# Patient Record
Sex: FEMALE | Race: BLACK OR AFRICAN AMERICAN | NOT HISPANIC OR LATINO
[De-identification: names, ages, dates, MRNs, and addresses within clinical notes are randomized per-mention and may not be internally consistent; named-entity substitution may affect disease eponyms.]

---

## 2023-08-09 ENCOUNTER — APPOINTMENT (OUTPATIENT)
Dept: ORTHOPEDIC SURGERY | Facility: CLINIC | Age: 53
End: 2023-08-09
Payer: COMMERCIAL

## 2023-08-09 VITALS — HEIGHT: 64.5 IN | BODY MASS INDEX: 34.74 KG/M2 | WEIGHT: 206 LBS

## 2023-08-09 PROBLEM — Z00.00 ENCOUNTER FOR PREVENTIVE HEALTH EXAMINATION: Status: ACTIVE | Noted: 2023-08-09

## 2023-08-09 PROCEDURE — J3490M: CUSTOM | Mod: LT

## 2023-08-09 PROCEDURE — 73564 X-RAY EXAM KNEE 4 OR MORE: CPT | Mod: LT

## 2023-08-09 PROCEDURE — 20611 DRAIN/INJ JOINT/BURSA W/US: CPT | Mod: LT

## 2023-08-09 PROCEDURE — 99203 OFFICE O/P NEW LOW 30 MIN: CPT | Mod: 25

## 2023-08-09 RX ORDER — SEMAGLUTIDE 2.68 MG/ML
INJECTION, SOLUTION SUBCUTANEOUS
Refills: 0 | Status: ACTIVE | COMMUNITY

## 2023-08-09 RX ORDER — METFORMIN HYDROCHLORIDE 1000 MG/1
1000 TABLET, COATED ORAL
Refills: 0 | Status: ACTIVE | COMMUNITY

## 2023-08-09 RX ORDER — PAROXETINE 7.5 MG/1
7.5 CAPSULE ORAL
Refills: 0 | Status: ACTIVE | COMMUNITY

## 2023-08-09 RX ORDER — IBUPROFEN 200 MG/1
CAPSULE, LIQUID FILLED ORAL
Refills: 0 | Status: ACTIVE | COMMUNITY

## 2023-08-09 RX ORDER — HYALURONATE SODIUM 20 MG/2 ML
20 SYRINGE (ML) INTRAARTICULAR
Qty: 3 | Refills: 0 | Status: COMPLETED | COMMUNITY
Start: 2023-08-09 | End: 2023-12-13

## 2023-08-09 NOTE — HISTORY OF PRESENT ILLNESS
[Gradual] : gradual [9] : 9 [7] : 7 [Dull/Aching] : dull/aching [Localized] : localized [Frequent] : frequent [Household chores] : household chores [Leisure] : leisure [Work] : work [Rest] : rest [Meds] : meds [Ice] : ice [Standing] : standing [Walking] : walking [Stairs] : stairs [Full time] : Work status: full time [] : no [FreeTextEntry1] : L knee [FreeTextEntry5] : 52 year old female is here for an evaluation for the L knee. No trauma/No injury occurred. 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 Pain started the past two months, presents with soreness and L knee feels like it gives up while walking/standing. Patient states went to her primary doctor yesterday and was prescribed with ibuprofen.  [FreeTextEntry6] : Soreness  [FreeTextEntry9] : Ibuprofen; tylenol (extra strength)

## 2023-08-09 NOTE — ASSESSMENT
[FreeTextEntry1] : ATRAUMATIC LEFT KNEE PAIN X 2 MONTHS, WORSE WITH PROLONGED STANDING NO RED FLAGS XRAYS WITH MILD OA, MOD PF OA CSI DISCUSSED AND DONE TODAY, TOLERATED WELL DISCUSSED VISCO, WILL REQUEST AUTH IF NEEDED DIET & EXERCISE

## 2023-08-09 NOTE — PROCEDURE
[FreeTextEntry3] : - Large joint injection was performed of the LEFT knee.  - The indication for this procedure was pain and inflammation. Patient has tried OTC's including aspirin, Ibuprofen, Aleve, etc or prescription NSAIDS, and/or exercises at home and/or physical therapy without satisfactory response, patient had decreased mobility in the joint and the risks benefits, and alternatives have been discussed, and verbal consent was obtained. The site was prepped with alcohol, betadine, ethyl chloride sprayed topically and sterile technique used.  - An injection of Betamethasone 2cc; Marcaine 5cc injected. - Patient was advised to call if redness, pain or fever occur, apply ice for 15 minutes out of every hour for the next 12-24 hours as tolerated and patient was advised to rest the joint(s) for 2 days.  - Ultrasound guidance was indicated for this patient due to prior failure or difficult injection. All ultrasound images have been permanently captured and stored accordingly in our picture archiving and communication system. Visualization of the needle and placement of injection was performed without complication.

## 2023-08-09 NOTE — IMAGING
[Left] : left knee [All Views] : anteroposterior, lateral, skyline, and anteroposterior standing [There are no fractures, subluxations or dislocations. No significant abnormalities are seen] : There are no fractures, subluxations or dislocations. No significant abnormalities are seen [Mild tricompartmental OA medial narrowing] : Mild tricompartmental OA medial narrowing [Mild tricompartmental OA lateral narrowing] : Mild tricompartmental OA lateral narrowing [Moderate patellofemoral OA] : Moderate patellofemoral OA [de-identified] : left knee No effusion, no warmth, no ecchymosis, no erythema. medial and lateral joint ttp, patellar crepitus Range of motion 0-120 without pain 5/5 quadriceps and hamstring strength Motor and sensory intact distally Negative Maria Guadalupe Non-antalgic gait

## 2023-09-06 ENCOUNTER — APPOINTMENT (OUTPATIENT)
Dept: ORTHOPEDIC SURGERY | Facility: CLINIC | Age: 53
End: 2023-09-06

## 2023-09-06 ENCOUNTER — APPOINTMENT (OUTPATIENT)
Dept: ORTHOPEDIC SURGERY | Facility: CLINIC | Age: 53
End: 2023-09-06
Payer: COMMERCIAL

## 2023-09-06 VITALS — BODY MASS INDEX: 34.74 KG/M2 | HEIGHT: 64.5 IN | WEIGHT: 206 LBS

## 2023-09-06 PROCEDURE — 99213 OFFICE O/P EST LOW 20 MIN: CPT | Mod: 25

## 2023-09-06 PROCEDURE — 20611 DRAIN/INJ JOINT/BURSA W/US: CPT | Mod: LT

## 2023-09-06 NOTE — PROCEDURE
[Large Joint Injection] : Large joint injection [Left] : of the left [Knee] : knee [Pain] : pain [Inflammation] : inflammation [X-ray evidence of Osteoarthritis on this or prior visit] : x-ray evidence of Osteoarthritis on this or prior visit [Alcohol] : alcohol [Betadine] : betadine [Ethyl Chloride sprayed topically] : ethyl chloride sprayed topically [Sterile technique used] : sterile technique used [Euflexxa(20mg)] : 20mg of Euflexxa [#1] : series #1 [] : Patient tolerated procedure well [Call if redness, pain or fever occur] : call if redness, pain or fever occur [Apply ice for 15min out of every hour for the next 12-24 hours as tolerated] : apply ice for 15 minutes out of every hour for the next 12-24 hours as tolerated [Patient was advised to rest the joint(s) for ____ days] : patient was advised to rest the joint(s) for [unfilled] days [Previous OTC use and PT nontherapeutic] : patient has tried OTC's including aspirin, Ibuprofen, Aleve, etc or prescription NSAIDS, and/or exercises at home and/or physical therapy without satisfactory response [Patient had decreased mobility in the joint] : patient had decreased mobility in the joint [Risks, benefits, alternatives discussed / Verbal consent obtained] : the risks benefits, and alternatives have been discussed, and verbal consent was obtained [Morbid obesity] : morbid obesity [Precise injection in area of tear] : precise injection in area of tear [Altered anatomic landmarks d/t erosive arthritis] : altered anatomic landmarks d/t erosive arthritis [All ultrasound images have been permanently captured and stored accordingly in our picture archiving and communication system] : All ultrasound images have been permanently captured and stored accordingly in our picture archiving and communication system [Visualization of the needle and placement of injection was performed without complication] : visualization of the needle and placement of injection was performed without complication

## 2023-09-06 NOTE — HISTORY OF PRESENT ILLNESS
[de-identified] : 9-6-23- for euflexxa #1 left knee [Gradual] : gradual [9] : 9 [7] : 7 [Dull/Aching] : dull/aching [Localized] : localized [Frequent] : frequent [Household chores] : household chores [Leisure] : leisure [Work] : work [Rest] : rest [Meds] : meds [Ice] : ice [Standing] : standing [Walking] : walking [Stairs] : stairs [] : no [Full time] : Work status: full time [FreeTextEntry1] : L knee [FreeTextEntry5] : 52 year old female is here for an evaluation for the L knee. No trauma/No injury occurred. 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 Pain started the past two months, presents with soreness and L knee feels like it gives up while walking/standing. Patient states went to her primary doctor yesterday and was prescribed with ibuprofen.  [FreeTextEntry6] : Soreness  [FreeTextEntry9] : Ibuprofen; tylenol (extra strength)

## 2023-09-06 NOTE — IMAGING
[de-identified] : left knee No effusion, no warmth, no ecchymosis, no erythema. medial and lateral joint ttp, patellar crepitus Range of motion 0-120 without pain 5/5 quadriceps and hamstring strength Motor and sensory intact distally Negative Maria Guadalupe Non-antalgic gait [Left] : left knee [All Views] : anteroposterior, lateral, skyline, and anteroposterior standing [There are no fractures, subluxations or dislocations. No significant abnormalities are seen] : There are no fractures, subluxations or dislocations. No significant abnormalities are seen [Mild tricompartmental OA medial narrowing] : Mild tricompartmental OA medial narrowing [Mild tricompartmental OA lateral narrowing] : Mild tricompartmental OA lateral narrowing [Moderate patellofemoral OA] : Moderate patellofemoral OA

## 2023-09-06 NOTE — ASSESSMENT
[FreeTextEntry1] : ATRAUMATIC LEFT KNEE PAIN X 2 MONTHS, WORSE WITH PROLONGED STANDING NO RED FLAGS XRAYS WITH MILD OA, MOD PF OA

## 2023-09-13 ENCOUNTER — APPOINTMENT (OUTPATIENT)
Dept: ORTHOPEDIC SURGERY | Facility: CLINIC | Age: 53
End: 2023-09-13

## 2023-09-13 ENCOUNTER — APPOINTMENT (OUTPATIENT)
Dept: ORTHOPEDIC SURGERY | Facility: CLINIC | Age: 53
End: 2023-09-13
Payer: COMMERCIAL

## 2023-09-13 VITALS — BODY MASS INDEX: 35.17 KG/M2 | WEIGHT: 206 LBS | HEIGHT: 64 IN

## 2023-09-13 PROCEDURE — 20611 DRAIN/INJ JOINT/BURSA W/US: CPT | Mod: LT

## 2023-09-13 PROCEDURE — 99212 OFFICE O/P EST SF 10 MIN: CPT | Mod: 25

## 2023-09-20 ENCOUNTER — APPOINTMENT (OUTPATIENT)
Dept: ORTHOPEDIC SURGERY | Facility: CLINIC | Age: 53
End: 2023-09-20

## 2023-09-20 ENCOUNTER — APPOINTMENT (OUTPATIENT)
Dept: ORTHOPEDIC SURGERY | Facility: CLINIC | Age: 53
End: 2023-09-20
Payer: COMMERCIAL

## 2023-09-20 VITALS — BODY MASS INDEX: 35.17 KG/M2 | WEIGHT: 206 LBS | HEIGHT: 64 IN

## 2023-09-20 PROCEDURE — 20610 DRAIN/INJ JOINT/BURSA W/O US: CPT | Mod: LT

## 2023-09-20 PROCEDURE — 99212 OFFICE O/P EST SF 10 MIN: CPT | Mod: 25

## 2024-02-29 ENCOUNTER — APPOINTMENT (OUTPATIENT)
Dept: ORTHOPEDIC SURGERY | Facility: CLINIC | Age: 54
End: 2024-02-29
Payer: COMMERCIAL

## 2024-02-29 VITALS — BODY MASS INDEX: 33.29 KG/M2 | HEIGHT: 64 IN | WEIGHT: 195 LBS

## 2024-02-29 DIAGNOSIS — E11.9 TYPE 2 DIABETES MELLITUS W/OUT COMPLICATIONS: ICD-10-CM

## 2024-02-29 PROCEDURE — 99214 OFFICE O/P EST MOD 30 MIN: CPT | Mod: 25

## 2024-02-29 PROCEDURE — 20611 DRAIN/INJ JOINT/BURSA W/US: CPT | Mod: 50

## 2024-02-29 PROCEDURE — J3490M: CUSTOM

## 2024-02-29 RX ORDER — HYALURONATE SODIUM 20 MG/2 ML
20 SYRINGE (ML) INTRAARTICULAR
Qty: 6 | Refills: 0 | Status: ACTIVE | COMMUNITY
Start: 2024-02-29 | End: 1900-01-01

## 2024-02-29 NOTE — IMAGING
[de-identified] : b/l knees Varus deformity Mild effusion, no warmth, no ecchymosis Medial joint line tenderness to palpation  Range of motion 0-110 with associated crepitus 5/5 quadriceps and hamstring strength Ligamentously stable Motor and sensory intact distally Non antalgic gait Negative Maria Guadalupe

## 2024-02-29 NOTE — ASSESSMENT
[FreeTextEntry1] : ACUTE EXACERBATION OF CHRONIC B/L KNEE OA (R>L)  GOOD RELIEF WITH EUFLEXXA INJECTIONS ON LEFT SIDE IN SEPT 2023  WILL SUBMIT AUTH FOR B/L KNEE EUFLEXXA INJECTIONS TO BE ADMINISTERED ON OR AFTER 3/21/24  CSI RIGHT KNEE DISCUSSED AND DONE TODAY - TOLERATED WELL  ADVISED OF TRANSIENT SPIKE IN BGL AFTER CSI  CRYOTHERAPY, NSAIDS PRN  RTC 2 WEEKS FOR POSSIBLE CSI INTO LEFT KNEE OR WHEN SHE IS READY TO START GEL INJECTIONS

## 2024-02-29 NOTE — HISTORY OF PRESENT ILLNESS
[de-identified] : 2/28/24: Here for left knee follow up. Finished euflexxa for the left knee on 9/20/23 with good relief until recently. States her right knee is now bothering her. Notes weakness and instability at times.  [FreeTextEntry1] : L knee

## 2024-03-14 ENCOUNTER — APPOINTMENT (OUTPATIENT)
Dept: ORTHOPEDIC SURGERY | Facility: CLINIC | Age: 54
End: 2024-03-14

## 2024-04-10 ENCOUNTER — APPOINTMENT (OUTPATIENT)
Dept: ORTHOPEDIC SURGERY | Facility: CLINIC | Age: 54
End: 2024-04-10
Payer: COMMERCIAL

## 2024-04-10 VITALS — BODY MASS INDEX: 33.29 KG/M2 | WEIGHT: 195 LBS | HEIGHT: 64 IN

## 2024-04-10 PROCEDURE — 20611 DRAIN/INJ JOINT/BURSA W/US: CPT | Mod: 50

## 2024-04-10 PROCEDURE — 99213 OFFICE O/P EST LOW 20 MIN: CPT | Mod: 25

## 2024-04-10 NOTE — PROCEDURE
[FreeTextEntry3] : Large joint injection was performed of the BILAT knee.  The indication for this procedure was pain, inflammation and x-ray evidence of Osteoarthritis on this or prior visit. The site was prepped with alcohol, betadine, ethyl chloride sprayed topically and sterile technique used.  An injection of EUFLEXXA 2ml, series #1 was used.   Patient was advised to call if redness, pain or fever occur, apply ice for 15 minutes out of every hour for the next 12-24 hours as tolerated and patient was advised to rest the joint(s) for 2 days. Patient has tried OTC's including aspirin, Ibuprofen, Aleve, etc or prescription NSAIDS, and/or exercises at home and/or physical therapy without satisfactory response, patient had decreased mobility in the joint and the risks benefits, and alternatives have been discussed, and verbal consent was obtained.  Ultrasound guidance was indicated for this patient due to prior failure or difficult injection. All ultrasound images have been permanently captured and stored accordingly in our picture archiving and communication system. Visualization of the needle and placement of injection was performed without complication.

## 2024-04-10 NOTE — HISTORY OF PRESENT ILLNESS
[1] : 1 [Euflexxa] : Euflexxa [de-identified] : Patient is here to begin bilateral knee euflexxa injections.  [FreeTextEntry1] : Knees

## 2024-04-10 NOTE — IMAGING
[de-identified] : b/l knees Varus deformity Mild effusion, no warmth, no ecchymosis Medial joint line tenderness to palpation  Range of motion 0-110 with associated crepitus 5/5 quadriceps and hamstring strength Ligamentously stable Motor and sensory intact distally Non antalgic gait Negative Maria Guadalupe

## 2024-04-10 NOTE — ASSESSMENT
[FreeTextEntry1] : Follow-up of chronic bilateral knee arthritis exacerbation.  The symptoms persist.  We had a discussion about possible treatment options.  Continue anti-inflammatories and weight loss.  We discussed risk benefits and alternatives to Euflexxa series.  She wants to proceed  -The patient's diagnosis was reviewed in detail. Explained this is a chronic, progressive deteriorating condition that may need treatment from time to time as the flare-ups occur.  -The natural progression of Osteoarthritis was explained to the patient. We discussed the possible treatment options from conservative to operative.  -We discussed prescription strength NSAIDs, Glucosamine and Chondroitin sulfate, and Physical Therapy as well different types of injections including viscosupplementation.  -We also discussed that at some point they may progress to needed a total knee replacement.

## 2024-04-18 ENCOUNTER — APPOINTMENT (OUTPATIENT)
Dept: ORTHOPEDIC SURGERY | Facility: CLINIC | Age: 54
End: 2024-04-18
Payer: COMMERCIAL

## 2024-04-18 VITALS — BODY MASS INDEX: 33.29 KG/M2 | WEIGHT: 195 LBS | HEIGHT: 64 IN

## 2024-04-18 PROCEDURE — 99212 OFFICE O/P EST SF 10 MIN: CPT | Mod: 25

## 2024-04-18 PROCEDURE — 20611 DRAIN/INJ JOINT/BURSA W/US: CPT | Mod: 50

## 2024-04-18 NOTE — ASSESSMENT
[FreeTextEntry1] : B/L KNEE EUFLEXXA #2 TODAY - TOLERATED WELL  DISCUSSED POST PROCEDURE RECOMMENDATIONS  RTC 1 WEEK TO CONTINUE SERIES   -The patient's diagnosis was reviewed in detail. Explained this is a chronic, progressive deteriorating condition that may need treatment from time to time as the flare-ups occur.  -The natural progression of Osteoarthritis was explained to the patient. We discussed the possible treatment options from conservative to operative.  -We discussed prescription strength NSAIDs, Glucosamine and Chondroitin sulfate, and Physical Therapy as well different types of injections including viscosupplementation.  -We also discussed that at some point they may progress to needed a total knee replacement.

## 2024-04-18 NOTE — PROCEDURE
[FreeTextEntry3] : Large joint injection was performed of the BILAT knee.  The indication for this procedure was pain, inflammation and x-ray evidence of Osteoarthritis on this or prior visit. The site was prepped with alcohol, betadine, ethyl chloride sprayed topically and sterile technique used.  An injection of EUFLEXXA 2ml, series #2 was used.   Patient was advised to call if redness, pain or fever occur, apply ice for 15 minutes out of every hour for the next 12-24 hours as tolerated and patient was advised to rest the joint(s) for 2 days. Patient has tried OTC's including aspirin, Ibuprofen, Aleve, etc or prescription NSAIDS, and/or exercises at home and/or physical therapy without satisfactory response, patient had decreased mobility in the joint and the risks benefits, and alternatives have been discussed, and verbal consent was obtained.  Ultrasound guidance was indicated for this patient due to prior failure or difficult injection. All ultrasound images have been permanently captured and stored accordingly in our picture archiving and communication system. Visualization of the needle and placement of injection was performed without complication.

## 2024-04-18 NOTE — IMAGING
[de-identified] : b/l knees Varus deformity Mild effusion, no warmth, no ecchymosis Medial joint line tenderness to palpation  Range of motion 0-110 with associated crepitus 5/5 quadriceps and hamstring strength Ligamentously stable Motor and sensory intact distally Non antalgic gait Negative Maria Guadalupe
No abnormalities noted

## 2024-04-18 NOTE — HISTORY OF PRESENT ILLNESS
[2] : 2 [Euflexxa] : Euflexxa [de-identified] : Patient is here to begin bilateral knee euflexxa injections.   4/18/24: Here for B/L knee euflexxa #2.  [FreeTextEntry1] : Knees

## 2024-04-25 ENCOUNTER — APPOINTMENT (OUTPATIENT)
Dept: ORTHOPEDIC SURGERY | Facility: CLINIC | Age: 54
End: 2024-04-25
Payer: COMMERCIAL

## 2024-04-25 VITALS — WEIGHT: 195 LBS | BODY MASS INDEX: 33.29 KG/M2 | HEIGHT: 64 IN

## 2024-04-25 DIAGNOSIS — M17.12 UNILATERAL PRIMARY OSTEOARTHRITIS, LEFT KNEE: ICD-10-CM

## 2024-04-25 DIAGNOSIS — M17.11 UNILATERAL PRIMARY OSTEOARTHRITIS, RIGHT KNEE: ICD-10-CM

## 2024-04-25 DIAGNOSIS — E66.9 OBESITY, UNSPECIFIED: ICD-10-CM

## 2024-04-25 PROCEDURE — 99212 OFFICE O/P EST SF 10 MIN: CPT | Mod: 25

## 2024-04-25 PROCEDURE — 20611 DRAIN/INJ JOINT/BURSA W/US: CPT | Mod: 50

## 2024-04-25 NOTE — PROCEDURE
[FreeTextEntry3] : Large joint injection was performed of the BILAT knee.  The indication for this procedure was pain, inflammation and x-ray evidence of Osteoarthritis on this or prior visit. The site was prepped with alcohol, betadine, ethyl chloride sprayed topically and sterile technique used.  An injection of EUFLEXXA 2ml, series #3 was used.   Patient was advised to call if redness, pain or fever occur, apply ice for 15 minutes out of every hour for the next 12-24 hours as tolerated and patient was advised to rest the joint(s) for 2 days. Patient has tried OTC's including aspirin, Ibuprofen, Aleve, etc or prescription NSAIDS, and/or exercises at home and/or physical therapy without satisfactory response, patient had decreased mobility in the joint and the risks benefits, and alternatives have been discussed, and verbal consent was obtained.  Ultrasound guidance was indicated for this patient due to prior failure or difficult injection. All ultrasound images have been permanently captured and stored accordingly in our picture archiving and communication system. Visualization of the needle and placement of injection was performed without complication.

## 2024-04-25 NOTE — HISTORY OF PRESENT ILLNESS
[2] : 2 [Euflexxa] : Euflexxa [de-identified] : Patient is here to begin bilateral knee euflexxa injections.   4/18/24: Here for B/L knee euflexxa #2.   4/25/24: B/L knee euflexxa #3.  [FreeTextEntry1] : Knees

## 2024-04-25 NOTE — IMAGING
[de-identified] : b/l knees Varus deformity Mild effusion, no warmth, no ecchymosis Medial joint line tenderness to palpation  Range of motion 0-110 with associated crepitus

## 2024-05-06 ENCOUNTER — APPOINTMENT (OUTPATIENT)
Dept: ORTHOPEDIC SURGERY | Facility: CLINIC | Age: 54
End: 2024-05-06
Payer: COMMERCIAL

## 2024-05-06 VITALS — WEIGHT: 198 LBS | HEIGHT: 64 IN | BODY MASS INDEX: 33.8 KG/M2

## 2024-05-06 DIAGNOSIS — S92.355A NONDISPLACED FRACTURE OF FIFTH METATARSAL BONE, LEFT FOOT, INITIAL ENCOUNTER FOR CLOSED FRACTURE: ICD-10-CM

## 2024-05-06 PROCEDURE — L4361: CPT | Mod: LT

## 2024-05-06 PROCEDURE — 73630 X-RAY EXAM OF FOOT: CPT | Mod: LT

## 2024-05-06 PROCEDURE — 73610 X-RAY EXAM OF ANKLE: CPT | Mod: LT

## 2024-05-06 PROCEDURE — 99213 OFFICE O/P EST LOW 20 MIN: CPT

## 2024-05-06 NOTE — ASSESSMENT
[FreeTextEntry1] : Nondisplaced fifth metatarsal base fracture after twisting injury.  Advised tall cam boot and follow-up with our foot and ankle team in a week or 2 for repeat x-rays.

## 2024-05-06 NOTE — IMAGING
[de-identified] : No swelling TTP over base of 5th met Negative drawer, no pain with single heel raise. Motor and sensory intact distally +2 DP and PT pulses Negative violet Antalgic gait [Left] : left foot [Fracture] : Fracture

## 2024-05-06 NOTE — HISTORY OF PRESENT ILLNESS
[5] : 5 [1] : 2 [de-identified] : 5.6.24  Patient here for left foot pain.  On Tuesday 4.30.24 patient twisted ankle and landed on on the mid section on the foot. [FreeTextEntry6] : soreness

## 2024-05-16 ENCOUNTER — APPOINTMENT (OUTPATIENT)
Dept: ORTHOPEDIC SURGERY | Facility: CLINIC | Age: 54
End: 2024-05-16
Payer: COMMERCIAL

## 2024-05-16 VITALS — BODY MASS INDEX: 33.8 KG/M2 | WEIGHT: 198 LBS | HEIGHT: 64 IN

## 2024-05-16 DIAGNOSIS — S92.352A DISPLACED FRACTURE OF FIFTH METATARSAL BONE, LEFT FOOT, INITIAL ENCOUNTER FOR CLOSED FRACTURE: ICD-10-CM

## 2024-05-16 PROCEDURE — 99214 OFFICE O/P EST MOD 30 MIN: CPT | Mod: 25

## 2024-05-16 PROCEDURE — 28470 CLTX METATARSAL FX WO MNP EA: CPT

## 2024-05-16 NOTE — HISTORY OF PRESENT ILLNESS
[Dull/Aching] : dull/aching [Intermittent] : intermittent [de-identified] : 05/16/2024: inversion injury 2 wks ago w/ foot pain. saw dr guevara who placed in boot. no prior foot probs. +dm (a1c ~5). denies tob.  [] : Post Surgical Visit: no [FreeTextEntry1] : L foot  [FreeTextEntry3] : 4/30/24 [FreeTextEntry6] : soreness

## 2024-05-16 NOTE — PHYSICAL EXAM
[Left] : left foot and ankle [Mild] : mild swelling of lateral foot [NL (40)] : plantar flexion 40 degrees [NL 30)] : inversion 30 degrees [NL (20)] : eversion 20 degrees [5___] : eversion 5[unfilled]/5 [2+] : dorsalis pedis pulse: 2+ [] : patient ambulates without assistive device [TWNoteComboBox7] : dorsiflexion 15 degrees

## 2024-05-16 NOTE — ASSESSMENT
[FreeTextEntry1] : continue cam boot rec knee scooter --rx given ice/elevate nasids prn f/up 2 wks w/ foot xray

## 2024-05-16 NOTE — DATA REVIEWED
[Outside X-rays] : outside x-rays [Left] : left [Ankle] : ankle [Foot] : foot [I reviewed the films/CD and additionally noted] : I reviewed the films/CD and additionally noted [FreeTextEntry1] : prox 5th mt fx

## 2024-05-29 ENCOUNTER — APPOINTMENT (OUTPATIENT)
Dept: ORTHOPEDIC SURGERY | Facility: CLINIC | Age: 54
End: 2024-05-29
Payer: COMMERCIAL

## 2024-05-29 VITALS — HEIGHT: 64 IN | BODY MASS INDEX: 33.8 KG/M2 | WEIGHT: 198 LBS

## 2024-05-29 PROCEDURE — 73630 X-RAY EXAM OF FOOT: CPT | Mod: LT

## 2024-05-29 PROCEDURE — 99024 POSTOP FOLLOW-UP VISIT: CPT

## 2024-05-29 NOTE — HISTORY OF PRESENT ILLNESS
[Dull/Aching] : dull/aching [Intermittent] : intermittent [de-identified] : 05/16/2024: inversion injury 2 wks ago w/ foot pain. saw dr guevara who placed in boot. no prior foot probs. +dm (a1c ~5). denies tob.   05/29/2024: improving. walking in boot [] : Post Surgical Visit: no [FreeTextEntry1] : L foot  [FreeTextEntry3] : 4/30/24 [FreeTextEntry6] : soreness

## 2024-05-29 NOTE — PHYSICAL EXAM
[Left] : left foot and ankle [Mild] : mild swelling of lateral foot [NL (40)] : plantar flexion 40 degrees [NL 30)] : inversion 30 degrees [NL (20)] : eversion 20 degrees [5___] : eversion 5[unfilled]/5 [2+] : dorsalis pedis pulse: 2+ [] : negative anterior drawer at ankle [FreeTextEntry8] : improved [TWNoteComboBox7] : dorsiflexion 15 degrees

## 2024-06-12 ENCOUNTER — APPOINTMENT (OUTPATIENT)
Dept: ORTHOPEDIC SURGERY | Facility: CLINIC | Age: 54
End: 2024-06-12
Payer: COMMERCIAL

## 2024-06-12 VITALS — BODY MASS INDEX: 33.8 KG/M2 | WEIGHT: 198 LBS | HEIGHT: 64 IN

## 2024-06-12 DIAGNOSIS — S92.352D DISPLACED FRACTURE OF FIFTH METATARSAL BONE, LEFT FOOT, SUBSEQUENT ENCOUNTER FOR FRACTURE WITH ROUTINE HEALING: ICD-10-CM

## 2024-06-12 PROCEDURE — 99024 POSTOP FOLLOW-UP VISIT: CPT

## 2024-06-12 PROCEDURE — 73630 X-RAY EXAM OF FOOT: CPT | Mod: LT

## 2024-06-12 NOTE — PHYSICAL EXAM
[Left] : left foot and ankle [NL (40)] : plantar flexion 40 degrees [NL 30)] : inversion 30 degrees [5___] : eversion 5[unfilled]/5 [2+] : dorsalis pedis pulse: 2+ [NL (20)] : dorsiflexion 20 degrees [] : negative anterior drawer at ankle

## 2024-06-12 NOTE — HISTORY OF PRESENT ILLNESS
[Dull/Aching] : dull/aching [Intermittent] : intermittent [de-identified] : 05/16/2024: inversion injury 2 wks ago w/ foot pain. saw dr guevara who placed in boot. no prior foot probs. +dm (a1c ~5). denies tob.   05/29/2024: improving. walking in boot.  06/12/2024: improving. wb in boot.  [] : Post Surgical Visit: no [FreeTextEntry1] : L foot  [FreeTextEntry3] : 4/30/24 [FreeTextEntry6] : soreness

## 2024-07-15 ENCOUNTER — APPOINTMENT (OUTPATIENT)
Dept: ORTHOPEDIC SURGERY | Facility: CLINIC | Age: 54
End: 2024-07-15

## 2024-07-24 ENCOUNTER — APPOINTMENT (OUTPATIENT)
Dept: ORTHOPEDIC SURGERY | Facility: CLINIC | Age: 54
End: 2024-07-24
Payer: COMMERCIAL

## 2024-07-24 VITALS — HEIGHT: 64 IN | WEIGHT: 198 LBS | BODY MASS INDEX: 33.8 KG/M2

## 2024-07-24 DIAGNOSIS — S92.352D DISPLACED FRACTURE OF FIFTH METATARSAL BONE, LEFT FOOT, SUBSEQUENT ENCOUNTER FOR FRACTURE WITH ROUTINE HEALING: ICD-10-CM

## 2024-07-24 PROCEDURE — 73630 X-RAY EXAM OF FOOT: CPT | Mod: LT

## 2024-07-24 PROCEDURE — 99024 POSTOP FOLLOW-UP VISIT: CPT

## 2024-07-24 NOTE — HISTORY OF PRESENT ILLNESS
[Dull/Aching] : dull/aching [Intermittent] : intermittent [de-identified] : 05/16/2024: inversion injury 2 wks ago w/ foot pain. saw dr guevara who placed in boot. no prior foot probs. +dm (a1c ~5). denies tob.   05/29/2024: improving. walking in boot.  06/12/2024: improving. wb in boot.   7/24/2024: feels 95% better. walking in reg shoes. working full duty [] : Post Surgical Visit: no [FreeTextEntry1] : L foot  [FreeTextEntry6] : soreness  [FreeTextEntry3] : 4/30/24

## 2024-07-24 NOTE — PHYSICAL EXAM
[Left] : left foot and ankle [NL (40)] : plantar flexion 40 degrees [NL 30)] : inversion 30 degrees [NL (20)] : eversion 20 degrees [5___] : eversion 5[unfilled]/5 [2+] : dorsalis pedis pulse: 2+ [] : negative anterior drawer at ankle

## 2024-11-21 ENCOUNTER — APPOINTMENT (OUTPATIENT)
Dept: ORTHOPEDIC SURGERY | Facility: CLINIC | Age: 54
End: 2024-11-21
Payer: COMMERCIAL

## 2024-11-21 VITALS — BODY MASS INDEX: 33.8 KG/M2 | HEIGHT: 64 IN | WEIGHT: 198 LBS

## 2024-11-21 DIAGNOSIS — M17.12 UNILATERAL PRIMARY OSTEOARTHRITIS, LEFT KNEE: ICD-10-CM

## 2024-11-21 DIAGNOSIS — M17.11 UNILATERAL PRIMARY OSTEOARTHRITIS, RIGHT KNEE: ICD-10-CM

## 2024-11-21 DIAGNOSIS — E66.9 OBESITY, UNSPECIFIED: ICD-10-CM

## 2024-11-21 DIAGNOSIS — E11.9 TYPE 2 DIABETES MELLITUS W/OUT COMPLICATIONS: ICD-10-CM

## 2024-11-21 PROCEDURE — 99214 OFFICE O/P EST MOD 30 MIN: CPT

## 2024-11-21 RX ORDER — MELOXICAM 15 MG/1
15 TABLET ORAL DAILY
Qty: 30 | Refills: 1 | Status: ACTIVE | COMMUNITY
Start: 2024-11-21 | End: 2025-01-20